# Patient Record
Sex: MALE | Race: WHITE | NOT HISPANIC OR LATINO | Employment: FULL TIME | URBAN - METROPOLITAN AREA
[De-identification: names, ages, dates, MRNs, and addresses within clinical notes are randomized per-mention and may not be internally consistent; named-entity substitution may affect disease eponyms.]

---

## 2019-01-15 ENCOUNTER — HOSPITAL ENCOUNTER (EMERGENCY)
Facility: HOSPITAL | Age: 64
Discharge: HOME | End: 2019-01-15
Attending: EMERGENCY MEDICINE
Payer: COMMERCIAL

## 2019-01-15 ENCOUNTER — APPOINTMENT (EMERGENCY)
Dept: RADIOLOGY | Facility: HOSPITAL | Age: 64
End: 2019-01-15
Attending: EMERGENCY MEDICINE
Payer: COMMERCIAL

## 2019-01-15 VITALS
OXYGEN SATURATION: 98 % | BODY MASS INDEX: 26.6 KG/M2 | WEIGHT: 190 LBS | RESPIRATION RATE: 16 BRPM | SYSTOLIC BLOOD PRESSURE: 158 MMHG | TEMPERATURE: 98.2 F | HEART RATE: 54 BPM | DIASTOLIC BLOOD PRESSURE: 90 MMHG | HEIGHT: 71 IN

## 2019-01-15 DIAGNOSIS — M54.50 LOW BACK PAIN WITHOUT SCIATICA, UNSPECIFIED BACK PAIN LATERALITY, UNSPECIFIED CHRONICITY: Primary | ICD-10-CM

## 2019-01-15 LAB
BACTERIA URNS QL MICRO: ABNORMAL /HPF
BILIRUB UR QL STRIP.AUTO: NEGATIVE MG/DL
CLARITY UR REFRACT.AUTO: CLEAR
COLOR UR AUTO: YELLOW
GLUCOSE UR STRIP.AUTO-MCNC: NEGATIVE MG/DL
HGB UR QL STRIP.AUTO: NEGATIVE
HYALINE CASTS #/AREA URNS LPF: ABNORMAL /LPF
KETONES UR STRIP.AUTO-MCNC: NEGATIVE MG/DL
LEUKOCYTE ESTERASE UR QL STRIP.AUTO: 2
NITRITE UR QL STRIP.AUTO: NEGATIVE
PH UR STRIP.AUTO: 7 [PH]
PROT UR QL STRIP.AUTO: NEGATIVE
RBC #/AREA URNS HPF: ABNORMAL /HPF
SP GR UR REFRACT.AUTO: 1.01
SQUAMOUS URNS QL MICRO: 1 /HPF
UROBILINOGEN UR STRIP-ACNC: 0.2 EU/DL
WBC #/AREA URNS HPF: ABNORMAL /HPF

## 2019-01-15 PROCEDURE — 63700000 HC SELF-ADMINISTRABLE DRUG: Performed by: PHYSICIAN ASSISTANT

## 2019-01-15 PROCEDURE — 3E023GC INTRODUCTION OF OTHER THERAPEUTIC SUBSTANCE INTO MUSCLE, PERCUTANEOUS APPROACH: ICD-10-PCS | Performed by: EMERGENCY MEDICINE

## 2019-01-15 PROCEDURE — 81001 URINALYSIS AUTO W/SCOPE: CPT | Performed by: EMERGENCY MEDICINE

## 2019-01-15 PROCEDURE — 63600000 HC DRUGS/DETAIL CODE: Performed by: PHYSICIAN ASSISTANT

## 2019-01-15 PROCEDURE — 87086 URINE CULTURE/COLONY COUNT: CPT | Performed by: EMERGENCY MEDICINE

## 2019-01-15 PROCEDURE — 96374 THER/PROPH/DIAG INJ IV PUSH: CPT

## 2019-01-15 PROCEDURE — 3E033GC INTRODUCTION OF OTHER THERAPEUTIC SUBSTANCE INTO PERIPHERAL VEIN, PERCUTANEOUS APPROACH: ICD-10-PCS | Performed by: EMERGENCY MEDICINE

## 2019-01-15 PROCEDURE — 74176 CT ABD & PELVIS W/O CONTRAST: CPT

## 2019-01-15 PROCEDURE — 99284 EMERGENCY DEPT VISIT MOD MDM: CPT | Mod: 25

## 2019-01-15 PROCEDURE — 96372 THER/PROPH/DIAG INJ SC/IM: CPT | Mod: 59

## 2019-01-15 PROCEDURE — 63600000 HC DRUGS/DETAIL CODE: Performed by: EMERGENCY MEDICINE

## 2019-01-15 RX ORDER — TRAMADOL HYDROCHLORIDE 50 MG/1
50 TABLET ORAL EVERY 8 HOURS PRN
Qty: 18 TABLET | Refills: 0 | Status: SHIPPED | OUTPATIENT
Start: 2019-01-15 | End: 2023-10-30

## 2019-01-15 RX ORDER — KETOROLAC TROMETHAMINE 15 MG/ML
15 INJECTION, SOLUTION INTRAMUSCULAR; INTRAVENOUS ONCE
Status: COMPLETED | OUTPATIENT
Start: 2019-01-15 | End: 2019-01-15

## 2019-01-15 RX ORDER — HYDROMORPHONE HYDROCHLORIDE 1 MG/ML
1 INJECTION, SOLUTION INTRAMUSCULAR; INTRAVENOUS; SUBCUTANEOUS ONCE
Status: COMPLETED | OUTPATIENT
Start: 2019-01-15 | End: 2019-01-15

## 2019-01-15 RX ORDER — PREDNISONE 20 MG/1
40 TABLET ORAL ONCE
Status: COMPLETED | OUTPATIENT
Start: 2019-01-15 | End: 2019-01-15

## 2019-01-15 RX ORDER — EZETIMIBE AND SIMVASTATIN 10; 10 MG/1; MG/1
1 TABLET ORAL NIGHTLY
COMMUNITY
End: 2023-11-30

## 2019-01-15 RX ORDER — MORPHINE SULFATE 100 MG/5ML
5 SOLUTION ORAL ONCE
Status: COMPLETED | OUTPATIENT
Start: 2019-01-15 | End: 2019-01-15

## 2019-01-15 RX ORDER — CYCLOBENZAPRINE HCL 10 MG
10 TABLET ORAL ONCE
Status: COMPLETED | OUTPATIENT
Start: 2019-01-15 | End: 2019-01-15

## 2019-01-15 RX ORDER — DIAZEPAM 5 MG/1
5 TABLET ORAL EVERY 8 HOURS PRN
Qty: 15 TABLET | Refills: 0 | Status: SHIPPED | OUTPATIENT
Start: 2019-01-15 | End: 2023-10-30 | Stop reason: ALTCHOICE

## 2019-01-15 RX ADMIN — PREDNISONE 40 MG: 20 TABLET ORAL at 05:32

## 2019-01-15 RX ADMIN — MORPHINE SULFATE 5 MG: 100 SOLUTION ORAL at 05:35

## 2019-01-15 RX ADMIN — KETOROLAC TROMETHAMINE 15 MG: 15 INJECTION, SOLUTION INTRAMUSCULAR; INTRAVENOUS at 05:35

## 2019-01-15 RX ADMIN — HYDROMORPHONE HYDROCHLORIDE 1 MG: 1 INJECTION, SOLUTION INTRAMUSCULAR; INTRAVENOUS; SUBCUTANEOUS at 06:48

## 2019-01-15 RX ADMIN — CYCLOBENZAPRINE HYDROCHLORIDE 10 MG: 10 TABLET, FILM COATED ORAL at 05:32

## 2019-01-15 ASSESSMENT — ENCOUNTER SYMPTOMS
ABDOMINAL PAIN: 0
BACK PAIN: 1
NECK PAIN: 0

## 2019-01-15 NOTE — DISCHARGE INSTRUCTIONS
Over-the-counter pain medicines as needed for back pain.  Add on prescription pain medicine if over-the-counter is are not effective.  Consider topical treatment for symptoms.  Such as, topical heat or salonpas patches.  Called physiatrist for next available appointment.  Additionally, can follow-up with your primary care doctor as needed.

## 2019-01-15 NOTE — ED PROVIDER NOTES
"HPI     Chief Complaint   Patient presents with   • Back Pain       63-year-old male with history of high cholesterol, presents the emergency department complaining of left low back pain.  Patient reports approximately a week ago he was playing tennis when he fell.  Patient was able to get up with assistance and drive home.  Patient was then seen in urgent care where they prescribed him Mobic and Flexeril which seemed to help the pain somewhat in the beginning.  Patient has since run out of those medications.  Patient reports over the last 2-3 days he has been taking only Advil for the pain, and the pain continues to get worse.  Pain is worse with movement.  Denies loss of bowel or bladder function.  Patient did not have any imaging done at the urgent care.  Pt last took 400mg of Motrin at 0300             Patient History     Past Medical History:   Diagnosis Date   • Lipid disorder        History reviewed. No pertinent surgical history.    History reviewed. No pertinent family history.    Social History   Substance Use Topics   • Smoking status: Never Smoker   • Smokeless tobacco: Never Used   • Alcohol use Yes       Systems Reviewed from Nursing Triage:  Tobacco  Allergies  Meds  Problems  Med Hx  Surg Hx  Soc Hx         Review of Systems     Review of Systems   Gastrointestinal: Negative for abdominal pain.   Genitourinary:        Denies bowel/bladder dysfunction   Musculoskeletal: Positive for back pain and gait problem (secondary to pain). Negative for neck pain.        Physical Exam     ED Triage Vitals [01/15/19 0505]   Temp Pulse Resp BP SpO2   (!) 35.9 °C (96.7 °F) -- 18 (!) 193/91 95 %      Temp Source Heart Rate Source Patient Position BP Location FiO2 (%) (Set)   Temporal -- Lying -- --                     Patient Vitals for the past 24 hrs:   BP Temp Temp src Resp SpO2 Height Weight   01/15/19 0505 (!) 193/91 (!) 35.9 °C (96.7 °F) Temporal 18 95 % 1.803 m (5' 11\") 86.2 kg (190 lb) "           Physical Exam   Constitutional: He is oriented to person, place, and time. He appears well-developed and well-nourished. He appears distressed (Pt appears in moderate amt of pain).   Neck: Neck supple.   Pulmonary/Chest: Effort normal. No respiratory distress.   Musculoskeletal:        Lumbar back: He exhibits decreased range of motion (Secondary to pain) and tenderness (L lateral). He exhibits no bony tenderness and no laceration.   (+) straight leg on the L, 30 degrees  (-) straight leg on the R   Neurological: He is alert and oriented to person, place, and time.   Skin: Skin is warm and dry. No rash noted.   Psychiatric: He has a normal mood and affect. His behavior is normal.   Nursing note and vitals reviewed.           Procedures    ED Course & [facility]     Labs Reviewed - No data to display    X-RAY LUMBAR SPINE 2 OR 3 VIEWS    (Results Pending)               [facility]         ED Course as of Rui 15 0551   Tue Rui 15, 2019   0527 Impression: Back painPlan: X-ray, prednisone, Flexeril, Toradol, Roxanol, observe  [MG]   0550 Remainder of pt care signed out to Dr Joy  [MG]      ED Course User Index  [MG] Tegan Covington PA C [place], Marijo L, PA C  01/15/19 0551

## 2019-01-15 NOTE — ED ATTESTATION NOTE
Procedures  Physical Exam  Review of Systems    1/15/52339:54 AM  I have personally seen and examined the patient.  I reviewed and agree with the PA/NP/Resident's assessment and plan of care.    My examination, assessment, and plan of care of Nakul Tobar is as follows:  The patient presents with left flank pain. Started appx 1 week ago when he was lunging for a tennis ball while playing tennis. States it was the position that he was in that caused immediate pain. The pain caused him to lower to his knees and since then has had this pain. Pain comes and goes, worse with movement. Had similar pain about 2 months ago and has a hx of ureteral stones so went and saw his urologist. Had a CT scan done at that time and was told had such large kidney stones that they would likely never progress down the ureter. Recently rx'ed muscle relaxants but ran out. Pain severe since running out. No abd pain. No hematuria. No urinary symptoms.     Exam: Abdomen soft, non-tender. No midline spinal pain. No CVA tenderness. Patient endlessly pacing around the room.     Impression/Plan: Left lower lumbar pain after specific postural movement suggests more of a MSK etiology. No midline spinal pain and no red flag symptoms. However pt also states hx kidney stones and patient is endlessly pacing the room. Discussed repeating CT for further evaluation. Patient agreeable.      I was physically present for the key/critical portions of the following procedures: None     Tanisha Joy MD  01/15/19 8841

## 2019-01-17 LAB
BACTERIA UR CULT: ABNORMAL
BACTERIA UR CULT: ABNORMAL

## 2021-04-15 DIAGNOSIS — Z23 ENCOUNTER FOR IMMUNIZATION: ICD-10-CM

## 2023-10-24 NOTE — PROGRESS NOTES
".  Cardiology Outpatient  Progress note    Nakul Tobar is a 68 y.o. male  who presents with ***    Summary:    Past medical, family, social history were reviewed and there has been no significant interval change.       Past Medical History:   Diagnosis Date    Lipid disorder        : No past surgical history on file.    Allergies:   Patient has no known allergies.    Current Outpatient Medications   Medication Sig Dispense Refill    diazePAM (VALIUM) 5 mg tablet Take 1 tablet (5 mg total) by mouth every 8 (eight) hours as needed for muscle spasms. No driving or operating heavy machinery if taking. 15 tablet 0    ezetimibe-simvastatin (VYTORIN) 10-10 mg per tablet Take 1 tablet by mouth nightly.      traMADol (ULTRAM) 50 mg tablet Take 1 tablet (50 mg total) by mouth every 8 (eight) hours as needed for moderate pain. No driving or operating heavy machinery when taking. 18 tablet 0     No current facility-administered medications for this visit.          Social History     Socioeconomic History    Marital status: Single   Tobacco Use    Smoking status: Never    Smokeless tobacco: Never   Substance and Sexual Activity    Alcohol use: Yes    Drug use: No        No family history on file.    ROS    Objective     There were no vitals taken for this visit.  Wt Readings from Last 3 Encounters:   01/15/19 86.2 kg (190 lb)         Physical Exam     Labs   No results found for: \"WBC\", \"HGB\", \"HCT\", \"PLT\", \"CHOL\", \"TRIG\", \"HDL\", \"LDLCALC\", \"ALT\", \"AST\", \"NA\", \"K\", \"CL\", \"CREATININE\", \"BUN\", \"CO2\", \"TSH\", \"INR\", \"GLUCOSE\", \"HGBA1C\"        ECG EKG:  ***    Problem List Items Addressed This Visit    None      This patient note has been dictated using speech recognition software. Inadvertent speech recognition errors should be disregarded. Please do not hesitate to call my office for clarifications.                        "

## 2023-10-29 PROBLEM — I70.0 ABDOMINAL AORTIC ATHEROSCLEROSIS (CMS/HCC): Status: ACTIVE | Noted: 2023-10-29

## 2023-10-29 ASSESSMENT — ENCOUNTER SYMPTOMS
POLYDIPSIA: 0
SPUTUM PRODUCTION: 0
HEMOPTYSIS: 0
FEVER: 0
ODYNOPHAGIA: 0
HOARSE VOICE: 0
HEMATOCHEZIA: 0
HALLUCINATIONS: 0
WHEEZING: 0
BLURRED VISION: 0
HEMATEMESIS: 0
BRIEF PARALYSIS: 0
FOCAL WEAKNESS: 0
ABDOMINAL PAIN: 0
FALLS: 0
ALTERED MENTAL STATUS: 0
MYALGIAS: 0
DYSURIA: 0
DIAPHORESIS: 0
TREMORS: 0

## 2023-10-29 NOTE — PROGRESS NOTES
Cardiology New Outpatient      Nakul Tobar is a 68 y.o. male  who presents for cardiac evaluation.    Summary: Hypercholesterolemia.  Father with MI in his 70s.    HPI: There presents to evaluate his cardiovascular status.  He is concerned as he is getting older and his father had heart disease and he never really prosperity after the diagnosis..    Nakul has no diagnosis of known coronary disease, myocardial infarction, stroke, just of heart failure, arrhythmias, valve disease, peripheral disease, thromboembolic disease.  Denies angina, exertional dyspnea, orthopnea, palpitation, syncope, near syncope, claudication, focal neurologic symptoms, or medication side effects.    He is physically active frequent playing golf, bike riding for example 25 miles over the weekend, pickleball and other activity without any cardiovascular symptoms or decrement in his stamina.    Reviewing an old CAT scan of the abdomen 2019 there was mild abdominal leg coronary atherosclerosis.    Past medical history hypercholesterolemia with Vytorin for years.    Social: Never smoker.  Social alcohol.    Family: Father with heart disease in his early 70s.       Past Medical History:   Diagnosis Date    Lipid disorder      Allergies:   Patient has no known allergies.    Current Outpatient Medications   Medication Sig Dispense Refill    ezetimibe-simvastatin (VYTORIN) 10-10 mg per tablet Take 1 tablet by mouth nightly.       No current facility-administered medications for this visit.          Social History     Socioeconomic History    Marital status:      Spouse name: None    Number of children: None    Years of education: None    Highest education level: None   Tobacco Use    Smoking status: Never    Smokeless tobacco: Never   Substance and Sexual Activity    Alcohol use: Yes    Drug use: No       Review of Systems   Constitutional: Negative for diaphoresis and fever.   HENT: Negative for hoarse voice and odynophagia.   "  Eyes: Negative for blurred vision and visual disturbance.   Respiratory: Negative for hemoptysis, sputum production and wheezing.    Endocrine: Negative for cold intolerance, heat intolerance and polydipsia.   Skin: Negative for rash.   Musculoskeletal: Negative for falls, muscle weakness and myalgias.   Gastrointestinal: Negative for abdominal pain, hematemesis and hematochezia.   Genitourinary: Negative for dysuria.   Neurological: Negative for brief paralysis, focal weakness and tremors.   Psychiatric/Behavioral: Negative for altered mental status and hallucinations.       Objective     Visit Vitals  /78 (BP Location: Left upper arm, Patient Position: Sitting)   Pulse 60   Resp 16   Ht 1.803 m (5' 11\")   Wt 84.4 kg (186 lb)   SpO2 98%   BMI 25.94 kg/m²     Wt Readings from Last 3 Encounters:   10/30/23 84.4 kg (186 lb)   01/15/19 86.2 kg (190 lb)       Physical Exam  Vitals reviewed.   Constitutional:       Appearance: He is well-developed. He is not diaphoretic.   HENT:      Head: Atraumatic.   Eyes:      General: No scleral icterus.     Conjunctiva/sclera: Conjunctivae normal.   Neck:      Thyroid: No thyromegaly.      Vascular: No carotid bruit or JVD.      Trachea: No tracheal deviation.   Cardiovascular:      Rate and Rhythm: Normal rate and regular rhythm.      Heart sounds: Normal heart sounds. No murmur heard.     No friction rub. No gallop.   Pulmonary:      Effort: Pulmonary effort is normal. No respiratory distress.      Breath sounds: Normal breath sounds. No wheezing or rales.   Abdominal:      General: Bowel sounds are normal. There is no distension.      Palpations: Abdomen is soft.   Musculoskeletal:         General: No tenderness.   Skin:     General: Skin is warm and dry.   Neurological:      Mental Status: He is alert and oriented to person, place, and time.      Cranial Nerves: No cranial nerve deficit.          Labs   No recent labs available for review, new labs requested.    ECG "   10/30/2023  Sinus bradycardia rate 53    Assessment/Plan     Problem List Items Addressed This Visit        High    Abdominal aortic atherosclerosis (CMS/HCC)    Overview     2019 CT scan mild abd and illiac atherosclerosis         Current Assessment & Plan     This has been asymptomatic, as it usually is.  Physically very active as described above without any cardiovascular or peripheral vascular symptoms.  On statins no other intervention needed as he is already living a healthy lifestyle.         Relevant Orders    [facility] MUSE ECG 12 lead (clinic performed) (Completed)       Medium    Pure hypercholesterolemia - Primary    Current Assessment & Plan     His BMI is 25 and he describes a healthy diet.  He exercises regularly.  He has been on Vytorin for years.  He does not know his lipids offhand and new labs are pending shortly.    Reviewed healthy lifestyle for the cardiovascular risk prevention standpoint.    Coronary calcium scoring recommended to help restratify which would determine the intensity of statin treatment and the goals which would be desirable.         Relevant Orders    Lipid panel    Comprehensive metabolic panel    CT HEART CORONARY ARTERY CALCIUM SCORE WITHOUT IV CONTRAST       Low    Family history of coronary artery disease    Overview     Father in his early 70s         Current Assessment & Plan     Reviewed that although obviously concerning, and more so as 1 ages, an MRI technically this is not premature disease but prematurity is always in the IVB lynne.  Recommended treatment of all cardiovascular risk and the coronary calcium score to better define for a his individual risk.  He leads a healthy lifestyle otherwise without hypertension, or diabetes.  Never smoker and regular exercise.             Orders Placed This Encounter   Procedures    CT HEART CORONARY ARTERY CALCIUM SCORE WITHOUT IV CONTRAST     Standing Status:   Future     Standing Expiration Date:   10/30/2024     Order  Specific Question:   Release to patient     Answer:   Immediate     Order Specific Question:   Release to patient     Answer:   Immediate [1]    Lipid panel     Standing Status:   Future     Number of Occurrences:   1     Standing Expiration Date:   10/30/2024     Order Specific Question:   Release to patient     Answer:   Immediate [1]    Comprehensive metabolic panel     Standing Status:   Future     Number of Occurrences:   1     Standing Expiration Date:   10/30/2024     Order Specific Question:   Release to patient     Answer:   Immediate [1]    [facility] MUSE ECG 12 lead (clinic performed)     Scheduling Instructions:      PLEASE USE THIS ORDER FOR ECG'S PERFORMED IN PHYSICIAN OFFICES     Order Specific Question:   Release to patient     Answer:   Immediate [1]         Gregorio Love DO  10/30/2023  11:38 AM    This patient note has been dictated using speech recognition software. Inadvertent speech recognition errors should be disregarded. Please do not hesitate to call my office for clarifications.

## 2023-10-30 ENCOUNTER — TRANSCRIBE ORDERS (OUTPATIENT)
Dept: ADMINISTRATIVE | Age: 68
End: 2023-10-30

## 2023-10-30 ENCOUNTER — HOSPITAL ENCOUNTER (OUTPATIENT)
Dept: RADIOLOGY | Age: 68
Discharge: HOME | End: 2023-10-30
Attending: FAMILY MEDICINE
Payer: MEDICARE

## 2023-10-30 ENCOUNTER — OFFICE VISIT (OUTPATIENT)
Dept: CARDIOLOGY | Facility: CLINIC | Age: 68
End: 2023-10-30
Payer: MEDICARE

## 2023-10-30 VITALS
HEART RATE: 60 BPM | BODY MASS INDEX: 26.04 KG/M2 | OXYGEN SATURATION: 98 % | SYSTOLIC BLOOD PRESSURE: 126 MMHG | RESPIRATION RATE: 16 BRPM | WEIGHT: 186 LBS | HEIGHT: 71 IN | DIASTOLIC BLOOD PRESSURE: 78 MMHG

## 2023-10-30 DIAGNOSIS — M79.644 PAIN IN RIGHT FINGER(S): ICD-10-CM

## 2023-10-30 DIAGNOSIS — M25.561 PAIN IN RIGHT KNEE: ICD-10-CM

## 2023-10-30 DIAGNOSIS — Z82.49 FAMILY HISTORY OF CORONARY ARTERY DISEASE: ICD-10-CM

## 2023-10-30 DIAGNOSIS — M25.561 PAIN IN RIGHT KNEE: Primary | ICD-10-CM

## 2023-10-30 DIAGNOSIS — I70.0 ABDOMINAL AORTIC ATHEROSCLEROSIS (CMS/HCC): ICD-10-CM

## 2023-10-30 DIAGNOSIS — E78.00 PURE HYPERCHOLESTEROLEMIA: Primary | ICD-10-CM

## 2023-10-30 LAB
ATRIAL RATE: 53
P AXIS: 62
PR INTERVAL: 188
QRS DURATION: 88
QT INTERVAL: 436
QTC CALCULATION(BAZETT): 409
R AXIS: 18
T WAVE AXIS: 52
VENTRICULAR RATE: 53

## 2023-10-30 PROCEDURE — 99204 OFFICE O/P NEW MOD 45 MIN: CPT | Performed by: INTERNAL MEDICINE

## 2023-10-30 PROCEDURE — 93000 ELECTROCARDIOGRAM COMPLETE: CPT | Performed by: INTERNAL MEDICINE

## 2023-10-30 PROCEDURE — 73140 X-RAY EXAM OF FINGER(S): CPT | Mod: RT

## 2023-10-30 PROCEDURE — 73564 X-RAY EXAM KNEE 4 OR MORE: CPT | Mod: RT

## 2023-10-30 NOTE — ASSESSMENT & PLAN NOTE
This has been asymptomatic, as it usually is.  Physically very active as described above without any cardiovascular or peripheral vascular symptoms.  On statins no other intervention needed as he is already living a healthy lifestyle.

## 2023-10-30 NOTE — LETTER
October 30, 2023                                                                                                                                                                                                                                                                                                               Bossman Person, DO  [address]  [address]  [facility] [address]    Patient: Nakul Tobar  YOB: 1955  Date of Visit: 10/30/2023    Dear Dr. Person:    Thank you for referring Nakul Tobar to me for evaluation. Below are the relevant portions of my assessment and plan of care.    If you have questions, please do not hesitate to call me. I look forward to following Nakul along with you.         Sincerely,        Gregorio Love,         CC: No Recipients          Assessment and Plan:  Problem List Items Addressed This Visit          High    Abdominal aortic atherosclerosis (CMS/HCC)     This has been asymptomatic, as it usually is.  Physically very active as described above without any cardiovascular or peripheral vascular symptoms.  On statins no other intervention needed as he is already living a healthy lifestyle.         Relevant Orders    [facility] MUSE ECG 12 lead (clinic performed) (Completed)       Medium    Pure hypercholesterolemia - Primary     His BMI is 25 and he describes a healthy diet.  He exercises regularly.  He has been on Vytorin for years.  He does not know his lipids offhand and new labs are pending shortly.    Reviewed healthy lifestyle for the cardiovascular risk prevention standpoint.    Coronary calcium scoring recommended to help restratify which would determine the intensity of statin treatment and the goals which would be desirable.         Relevant Orders    Lipid panel    Comprehensive metabolic panel    CT HEART CORONARY ARTERY CALCIUM SCORE WITHOUT IV CONTRAST       Low    Family history of coronary artery disease     Reviewed that although obviously  concerning, and more so as 1 ages, an MRI technically this is not premature disease but prematurity is always in the IVB lynne.  Recommended treatment of all cardiovascular risk and the coronary calcium score to better define for a his individual risk.  He leads a healthy lifestyle otherwise without hypertension, or diabetes.  Never smoker and regular exercise.

## 2023-10-30 NOTE — ASSESSMENT & PLAN NOTE
Reviewed that although obviously concerning, and more so as 1 ages, an MRI technically this is not premature disease but prematurity is always in the IVB lynne.  Recommended treatment of all cardiovascular risk and the coronary calcium score to better define for a his individual risk.  He leads a healthy lifestyle otherwise without hypertension, or diabetes.  Never smoker and regular exercise.

## 2023-10-30 NOTE — ASSESSMENT & PLAN NOTE
His BMI is 25 and he describes a healthy diet.  He exercises regularly.  He has been on Vytorin for years. Lab results reviewed from 12/2022, LDL was 106.    Reviewed healthy lifestyle for the cardiovascular risk prevention standpoint.    Coronary calcium scoring recommended to help restratify which would determine the intensity of statin treatment and the goals which would be desirable.

## 2023-11-28 ENCOUNTER — HOSPITAL ENCOUNTER (OUTPATIENT)
Dept: RADIOLOGY | Age: 68
Discharge: HOME | End: 2023-11-28
Attending: INTERNAL MEDICINE
Payer: MEDICARE

## 2023-11-28 DIAGNOSIS — E78.00 PURE HYPERCHOLESTEROLEMIA: ICD-10-CM

## 2023-11-28 PROCEDURE — G1004 CDSM NDSC: HCPCS

## 2023-11-28 PROCEDURE — 75571 CT HRT W/O DYE W/CA TEST: CPT | Mod: MG

## 2023-11-30 ENCOUNTER — TELEPHONE (OUTPATIENT)
Dept: CARDIOLOGY | Facility: CLINIC | Age: 68
End: 2023-11-30
Payer: MEDICARE

## 2023-11-30 DIAGNOSIS — R93.1 AGATSTON CORONARY ARTERY CALCIUM SCORE LESS THAN 100: ICD-10-CM

## 2023-11-30 DIAGNOSIS — E78.00 PURE HYPERCHOLESTEROLEMIA: Primary | ICD-10-CM

## 2023-11-30 RX ORDER — ROSUVASTATIN CALCIUM 20 MG/1
20 TABLET, COATED ORAL DAILY
Qty: 90 TABLET | Refills: 3 | Status: SHIPPED | OUTPATIENT
Start: 2023-11-30 | End: 2024-11-18

## 2023-12-08 ENCOUNTER — TELEPHONE (OUTPATIENT)
Dept: CARDIOLOGY | Facility: CLINIC | Age: 68
End: 2023-12-08
Payer: MEDICARE

## 2023-12-08 LAB
ALBUMIN SERPL-MCNC: 4.4 G/DL (ref 3.9–4.9)
ALBUMIN/GLOB SERPL: 1.8 {RATIO} (ref 1.2–2.2)
ALP SERPL-CCNC: 62 IU/L (ref 44–121)
ALT SERPL-CCNC: 22 IU/L (ref 0–44)
AST SERPL-CCNC: 20 IU/L (ref 0–40)
BILIRUB SERPL-MCNC: 1.1 MG/DL (ref 0–1.2)
BUN SERPL-MCNC: 20 MG/DL (ref 8–27)
BUN/CREAT SERPL: 21 (ref 10–24)
CALCIUM SERPL-MCNC: 9 MG/DL (ref 8.6–10.2)
CHLORIDE SERPL-SCNC: 102 MMOL/L (ref 96–106)
CHOLEST SERPL-MCNC: 140 MG/DL (ref 100–199)
CO2 SERPL-SCNC: 24 MMOL/L (ref 20–29)
CREAT SERPL-MCNC: 0.95 MG/DL (ref 0.76–1.27)
EGFRCR SERPLBLD CKD-EPI 2021: 88 ML/MIN/1.73
GLOBULIN SER CALC-MCNC: 2.5 G/DL (ref 1.5–4.5)
GLUCOSE SERPL-MCNC: 85 MG/DL (ref 70–99)
HDLC SERPL-MCNC: 63 MG/DL
LDLC SERPL CALC-MCNC: 66 MG/DL (ref 0–99)
POTASSIUM SERPL-SCNC: 4.5 MMOL/L (ref 3.5–5.2)
PROT SERPL-MCNC: 6.9 G/DL (ref 6–8.5)
SODIUM SERPL-SCNC: 139 MMOL/L (ref 134–144)
TRIGL SERPL-MCNC: 51 MG/DL (ref 0–149)
VLDLC SERPL CALC-MCNC: 11 MG/DL (ref 5–40)

## 2024-11-18 RX ORDER — ROSUVASTATIN CALCIUM 20 MG/1
20 TABLET, COATED ORAL DAILY
Qty: 90 TABLET | Refills: 0 | Status: SHIPPED | OUTPATIENT
Start: 2024-11-18 | End: 2025-02-20

## 2024-12-17 LAB
ALBUMIN SERPL-MCNC: 4.3 G/DL (ref 3.9–4.9)
ALP SERPL-CCNC: 59 IU/L (ref 44–121)
ALT SERPL-CCNC: 23 IU/L (ref 0–44)
AST SERPL-CCNC: 25 IU/L (ref 0–40)
BILIRUB SERPL-MCNC: 1 MG/DL (ref 0–1.2)
BUN SERPL-MCNC: 20 MG/DL (ref 8–27)
BUN/CREAT SERPL: 21 (ref 10–24)
CALCIUM SERPL-MCNC: 8.9 MG/DL (ref 8.6–10.2)
CHLORIDE SERPL-SCNC: 104 MMOL/L (ref 96–106)
CHOLEST SERPL-MCNC: 154 MG/DL (ref 100–199)
CO2 SERPL-SCNC: 23 MMOL/L (ref 20–29)
CREAT SERPL-MCNC: 0.95 MG/DL (ref 0.76–1.27)
EGFRCR SERPLBLD CKD-EPI 2021: 87 ML/MIN/1.73
GLOBULIN SER CALC-MCNC: 2.3 G/DL (ref 1.5–4.5)
GLUCOSE SERPL-MCNC: 92 MG/DL (ref 70–99)
HDLC SERPL-MCNC: 59 MG/DL
LDLC SERPL CALC-MCNC: 78 MG/DL (ref 0–99)
POTASSIUM SERPL-SCNC: 4.6 MMOL/L (ref 3.5–5.2)
PROT SERPL-MCNC: 6.6 G/DL (ref 6–8.5)
SODIUM SERPL-SCNC: 142 MMOL/L (ref 134–144)
TRIGL SERPL-MCNC: 90 MG/DL (ref 0–149)
VLDLC SERPL CALC-MCNC: 17 MG/DL (ref 5–40)

## 2025-02-18 ASSESSMENT — ENCOUNTER SYMPTOMS
POLYDIPSIA: 0
FOCAL WEAKNESS: 0
DYSURIA: 0
SPUTUM PRODUCTION: 0
HEMATOCHEZIA: 0
ABDOMINAL PAIN: 0
HALLUCINATIONS: 0
WHEEZING: 0
DIAPHORESIS: 0
BRIEF PARALYSIS: 0
HEMATEMESIS: 0
MYALGIAS: 0
HEMOPTYSIS: 0
FALLS: 0
ALTERED MENTAL STATUS: 0
FEVER: 0
BLURRED VISION: 0
ODYNOPHAGIA: 0
HOARSE VOICE: 0
TREMORS: 0

## 2025-02-18 NOTE — PROGRESS NOTES
Cardiology Office Progress Note      Nakul Tobar is a 69 y.o. male  who presents for cardiac evaluation.    Summary: Hypercholesterolemia.  Father with MI in his 70s.    Interval history:  Denies angina, exertional dyspnea, orthopnea, palpitation, syncope, near syncope, claudication, focal neurologic symptoms, or medication side effects.  He remains physically active.  He is playing pickle ball about 5 days a week in the summer splits between pickleball and golf.  Absolutely no cardiovascular symptoms.    Past medical history hypercholesterolemia with Vytorin for years.  Social: Never smoker.  Social alcohol.  Family: Father with heart disease in his early 70s.       Past Medical History:   Diagnosis Date    Lipid disorder      Allergies:   Patient has no known allergies.    Current Outpatient Medications   Medication Sig Dispense Refill    rosuvastatin (CRESTOR) 40 mg tablet Take 1 tablet (40 mg total) by mouth daily. 90 tablet 3     No current facility-administered medications for this visit.          Social History     Socioeconomic History    Marital status:      Spouse name: None    Number of children: None    Years of education: None    Highest education level: None   Tobacco Use    Smoking status: Never    Smokeless tobacco: Never   Substance and Sexual Activity    Alcohol use: Yes    Drug use: No       Review of Systems   Constitutional: Negative for diaphoresis and fever.   HENT:  Negative for hoarse voice and odynophagia.    Eyes:  Negative for blurred vision and visual disturbance.   Respiratory:  Negative for hemoptysis, sputum production and wheezing.    Endocrine: Negative for cold intolerance, heat intolerance and polydipsia.   Skin:  Negative for rash.   Musculoskeletal:  Negative for falls, muscle weakness and myalgias.   Gastrointestinal:  Negative for abdominal pain, hematemesis and hematochezia.   Genitourinary:  Negative for dysuria.   Neurological:  Negative for brief paralysis, focal  "weakness and tremors.   Psychiatric/Behavioral:  Negative for altered mental status and hallucinations.        Objective     Visit Vitals  /74   Pulse (!) 56   Ht 1.803 m (5' 11\")   Wt 81.2 kg (179 lb)   BMI 24.97 kg/m²       Wt Readings from Last 3 Encounters:   02/20/25 81.2 kg (179 lb)   10/30/23 84.4 kg (186 lb)   01/15/19 86.2 kg (190 lb)       Physical Exam  Vitals reviewed.   Constitutional:       Appearance: He is well-developed. He is not diaphoretic.   HENT:      Head: Atraumatic.   Eyes:      General: No scleral icterus.     Conjunctiva/sclera: Conjunctivae normal.   Neck:      Thyroid: No thyromegaly.      Vascular: No carotid bruit or JVD.      Trachea: No tracheal deviation.   Cardiovascular:      Rate and Rhythm: Normal rate and regular rhythm.      Heart sounds: Normal heart sounds. No murmur heard.     No friction rub. No gallop.   Pulmonary:      Effort: Pulmonary effort is normal. No respiratory distress.      Breath sounds: Normal breath sounds. No wheezing or rales.   Abdominal:      General: Bowel sounds are normal. There is no distension.      Palpations: Abdomen is soft.   Musculoskeletal:         General: No tenderness.   Skin:     General: Skin is warm and dry.   Neurological:      Mental Status: He is alert and oriented to person, place, and time.      Cranial Nerves: No cranial nerve deficit.       Labs: Last LDL was 88 creatinine 0.95 and unremarkable CMP    ECG   2/20/2025  Sinus bradycardia rate 49 with minor T wave changes and a ME interval 202 ms    Assessment/Plan     Problem List Items Addressed This Visit          High    Abdominal aortic atherosclerosis (CMS/HCC) - Primary    Overview     2019 CT scan mild abd and illiac atherosclerosis         Current Assessment & Plan     Continue medical management.  As expected asymptomatic.         Relevant Orders    [facility] MUSE ECG 12 lead (clinic performed) (Completed)    Agatston coronary artery calcium score less than 100    " Overview     Calcium Score 11/28/2023    CORONARY CALCIUM COMPOSITE AGATSTON SCORE: 16     Left Main: 16  LAD: 0  LCX: 0  RCA: 0         Current Assessment & Plan     He has low burden of atherosclerosis, calcium score 16, but it is on the left main.  Reviewed the lack of prospective evidence-based trials addressing small levels of atherosclerosis.  However, from a preventative approach maximizing treatable risk factors would be prudent.    Will increase rosuvastatin to 40 mg reevaluate.         Relevant Orders    [facility] MUSE ECG 12 lead (clinic performed) (Completed)    Comprehensive metabolic panel    Lipid panel       Medium    Pure hypercholesterolemia    Current Assessment & Plan     Last LDL was 88 which is probably too high given that he has atherosclerosis in his left main.  Healthy lifestyle which she is following.  Increase rosuvastatin to 40 mg  Labs in about 3 months and can always add ezetimibe if needed.  Discussed all.         Relevant Medications    rosuvastatin (CRESTOR) 40 mg tablet    Other Relevant Orders    [facility] MUSE ECG 12 lead (clinic performed) (Completed)    Comprehensive metabolic panel    Lipid panel       Low    Family history of coronary artery disease    Overview     Father in his early 70s         Current Assessment & Plan     This to be another reason for continuing treatment of all cardiovascular risk factors..         Relevant Orders    [facility] MUSE ECG 12 lead (clinic performed) (Completed)        Orders Placed This Encounter   Procedures    Comprehensive metabolic panel     Standing Status:   Future     Number of Occurrences:   1     Standing Expiration Date:   2/20/2026     Order Specific Question:   Release to patient     Answer:   Immediate [1]    Lipid panel     Standing Status:   Future     Number of Occurrences:   1     Standing Expiration Date:   2/20/2026     Order Specific Question:   Release to patient     Answer:   Immediate [1]    [facility] lead  (clinic performed)     Scheduling Instructions:      PLEASE USE THIS ORDER FOR ECG'S PERFORMED IN PHYSICIAN OFFICES     Order Specific Question:   Release to patient     Answer:   Immediate [1]     I attest that this visit supports the complexity inherent to evaluation and management associated with medical care services that serve as the continuing focal point for all needed health care services and/or medical care services that are part of ongoing care related to this patient's single, serious condition or a complex condition.     This patient note has been dictated using speech recognition software. Inadvertent speech recognition errors should be disregarded. Please do not hesitate to call my office for clarifications.    Gregorio Love DO, FACC

## 2025-02-20 ENCOUNTER — TELEPHONE (OUTPATIENT)
Dept: SCHEDULING | Facility: CLINIC | Age: 70
End: 2025-02-20
Payer: MEDICARE

## 2025-02-20 ENCOUNTER — OFFICE VISIT (OUTPATIENT)
Dept: CARDIOLOGY | Facility: CLINIC | Age: 70
End: 2025-02-20
Payer: MEDICARE

## 2025-02-20 VITALS
SYSTOLIC BLOOD PRESSURE: 126 MMHG | HEIGHT: 71 IN | WEIGHT: 179 LBS | BODY MASS INDEX: 25.06 KG/M2 | HEART RATE: 56 BPM | DIASTOLIC BLOOD PRESSURE: 74 MMHG

## 2025-02-20 DIAGNOSIS — E78.00 PURE HYPERCHOLESTEROLEMIA: ICD-10-CM

## 2025-02-20 DIAGNOSIS — R93.1 AGATSTON CORONARY ARTERY CALCIUM SCORE LESS THAN 100: ICD-10-CM

## 2025-02-20 DIAGNOSIS — I70.0 ABDOMINAL AORTIC ATHEROSCLEROSIS (CMS/HCC): Primary | ICD-10-CM

## 2025-02-20 DIAGNOSIS — Z82.49 FAMILY HISTORY OF CORONARY ARTERY DISEASE: ICD-10-CM

## 2025-02-20 LAB
ATRIAL RATE: 49
P AXIS: 65
PR INTERVAL: 202
QRS DURATION: 90
QT INTERVAL: 438
QTC CALCULATION(BAZETT): 395
R AXIS: 10
T WAVE AXIS: 61
VENTRICULAR RATE: 49

## 2025-02-20 PROCEDURE — 99214 OFFICE O/P EST MOD 30 MIN: CPT | Performed by: INTERNAL MEDICINE

## 2025-02-20 PROCEDURE — 93000 ELECTROCARDIOGRAM COMPLETE: CPT | Performed by: INTERNAL MEDICINE

## 2025-02-20 PROCEDURE — G2211 COMPLEX E/M VISIT ADD ON: HCPCS | Performed by: INTERNAL MEDICINE

## 2025-02-20 RX ORDER — ROSUVASTATIN CALCIUM 40 MG/1
40 TABLET, COATED ORAL DAILY
Qty: 90 TABLET | Refills: 3 | Status: SHIPPED | OUTPATIENT
Start: 2025-02-20 | End: 2026-02-15

## 2025-02-20 RX ORDER — ROSUVASTATIN CALCIUM 20 MG/1
20 TABLET, COATED ORAL DAILY
Qty: 90 TABLET | Refills: 0 | Status: SHIPPED | OUTPATIENT
Start: 2025-02-20 | End: 2025-02-20

## 2025-02-20 NOTE — ASSESSMENT & PLAN NOTE
He has low burden of atherosclerosis, calcium score 16, but it is on the left main.  Reviewed the lack of prospective evidence-based trials addressing small levels of atherosclerosis.  However, from a preventative approach maximizing treatable risk factors would be prudent.    Will increase rosuvastatin to 40 mg reevaluate.

## 2025-02-20 NOTE — TELEPHONE ENCOUNTER
Medication Clarification     Name of caller: Carlee    Relationship to patient: pharmacy    Name of patient: Nakul Tobar    Name of physician: Gregorio Love,     Name of medication: rosuvastatin (CRESTOR) 40 mg tablet     What needs to be clarified: states script for rosuvastatin (CRESTOR) 40 mg tablet was sent and script for rosuvastatin (CRESTOR) 20 mg tablet was sent. Clarifying correct mg.    Best contact number: [phone]

## 2025-02-20 NOTE — ASSESSMENT & PLAN NOTE
Last LDL was 88 which is probably too high given that he has atherosclerosis in his left main.  Healthy lifestyle which she is following.  Increase rosuvastatin to 40 mg  Labs in about 3 months and can always add ezetimibe if needed.  Discussed all.